# Patient Record
Sex: FEMALE | Race: WHITE
[De-identification: names, ages, dates, MRNs, and addresses within clinical notes are randomized per-mention and may not be internally consistent; named-entity substitution may affect disease eponyms.]

---

## 2020-01-16 NOTE — REP
Clinical:  Anatomical evaluation.

 

Comparison: None .

 

Findings:

Examination demonstrates a single live intrauterine pregnancy in breech

presentation.  Fetal motion is identified by technologist.  Placenta is noted the

posterior and grade zero without evidence for placenta previa or abruption.

Amniotic fluid volume is normal.  Cervix measures 3.7 cm in length and appears

closed.  No evidence for nuchal cord.

 

Multiple intrauterine fibroids are identified measuring up to 5.5 x 4.0 x 2.6

cm.

 

Gestational age by current measurements 20 weeks 5 days with JIGNESH 05/29/2020 .

 

FHR equals 136 beats per minute.

BPD  4.8 cm     20 weeks 4 days

HC  16.8 cm     19 weeks 3 days

AC  15.8 cm     21 weeks 0 days

FL  3.1 cm      19 weeks 4 days

HL  2.8 cm      19 weeks 0 days

HC/AC ratio  1.06

 

Estimated fetal weight 342 grams ( 33rd percentile).

 

Anatomical assessment demonstrates normal structures including cranium, choroid

plexus, cavum, cerebellum/posterior fossa, lungs, four-chamber heart,  diaphragm,

stomach, cord insertion/three-vessel cord, kidneys/bladder, and extremities.

 

Limited evaluation of the facial features, cardiac ventricular outflow tracts,

and spine.

 

Impression:

1.  Single live intrauterine pregnancy demonstrating appropriate estimated fetal

weight.

2.  Multiple uterine fibroids up to 5.5 cm.

3.  Anatomical limitations as noted above.

## 2020-02-20 NOTE — REP
Clinical:  Anatomical evaluation.

 

Comparison: 01/15/2020 .

 

Findings:

Examination demonstrates a single live intrauterine pregnancy in breech

presentation.  Fetal motion is identified by technologist.  Placenta is noted

posterior and grade I without evidence for placenta previa or abruption.

Amniotic fluid volume is normal.  Cervix measures 4.0 cm in length and appears

closed.  No evidence for nuchal cord.

 

Multiple fibroids are again noted and stable from prior examination measuring

approximately 3.0 cm, 3.3 cm, and 4.4 cm maximal diameter each.

 

Gestational age by LMP 25 weeks 6 days with JIGNESH 05/29/2020 .

Gestational age by current measurements 25 weeks 1 day with JIGNESH 06/03/2020 .

 

FHR equals 132 beats per minute.

Estimated fetal weight 847 grams ( 41st percentile).

 

Anatomical assessment demonstrates normal right cardiac ventricular outflow

tract.  Continued limited evaluation of the facial features and spine due to

fetal position and body habitus.

 

Impression:

1.  Continued limited evaluation of the facial features and spine.

2.  Stable uterine fibroids.

## 2020-04-06 NOTE — REP
REASON:  Followup fetal facial features and spine

 

The prior examination of 02/20/2020 showed normal fetal anatomy with the

exception of optimal visualization of the fetal facial features and spine for

which this examination was performed.

 

Multiple ultrasonographic image  of the gravid uterus show a single living

intrauterine gestation in the cephalic presentation.  Doppler interrogation of

the fetal heart shows the heart rate of 167 beats per minute.  The placenta is

posterior, fundal and not low-lying.  The subjective amniotic fluid volume is

within normal limits.  The cervix measures 3.7 cm in length and is closed.

Evaluation of the maternal adnexal spaces shows no abnormality.

 

Once again, there are multiple uterine fibroid status quo.

 

CHART:

 

BPD    8.7 cm  35 weeks 1 day

 

HC    32.7 cm = 37 weeks 1 day

 

AC    29.5 cm = 33 weeks 3 days

 

FL       6.3 cm = 32 weeks 4 days

 

The estimated fetal weight is  2265 grams, which is at the 97th percentile for a

31-week-2-day gestational age.

 

The fetal spine and fetal facial features were seen to be within normal limits.

 

 

IMPRESSION:

 

Single living intrauterine gestation as described above with an estimated

gestational age of 34 weeks 0 days via composite criteria.  I have been given no

JIGNESH based on today's composite criteria.  It was not calculated from the

worksheet parameters.  The only JIGNESH typed on the worksheet is 06/06/2020 but is

not stated that that JIGNESH is based on today's exam.  This needs further

evaluation.

 

Normal visualization of the fetal spine and the fetal facial features completes

the fetal anatomical screen.

## 2020-05-15 NOTE — REP
OBSTETRIC SONOGRAPHY:

 

HISTORY:  Supervision of pregnancy.  Fetal growth study.

 

FINDINGS:  Scanning through the gravid uterus demonstrates a viable single

intrauterine gestation in a cephalic fetal lie.  Fetal motion is observed and

heart rate is recorded at 160 beats per minute.  A posterior grade 1 placenta is

seen without evidence of previa or abruption.  Amniotic fluid is subjectively

normal.  Closed cervical length is measured transabdominally at 3.3 cm.  There

has been appropriate interval growth.

 

BIOMETRY CHART:

 

BPD  10.0 cm = 41 weeks 1 day

 

Head circumference  35.3 cm = 41 weeks 1 day

 

Abdominal circumference  34.2 cm = 38 weeks 1 day

 

Femur length  7.1 cm =  36 weeks 3 days

 

Humeral length  6.4 cm = 37 weeks 2 days

 

HC/AC ratio normal 1.03

 

Cephalic index normal 0.81

 

Estimated fetal weight 3490 grams, 7 pounds 11 ounces, 80th percentile for 36

weeks 6 days.

 

JOSE normal 16.0 cm.

 

IMPRESSION:

 

Viable single intrauterine gestation at 38 weeks 1 day by today's composite

sonographic criteria.  Expected gestational age estimate based on prior

sonography is 38 weeks 0 days.  JIGNESH by prior sonography May 29, 2020.  There is

evidence of appropriate interval growth.

## 2020-05-23 NOTE — HPE
DATE OF ADMISSION:  2020

 

34-year-old,  (G) 1, para (P) 0, female at 38 and 0/7 weeks gestation by

last menstrual period consistent with 9 week ultrasound, and expected date of

confinement (EDC) of 2020 who presents for labor induction.  The indication

for induction at less than 39 weeks is gestational hypertension.  She denies

contractions or vaginal bleeding.

 

PRENATAL COURSE:  The patient initiated prenatal care at 13 weeks gestation.

Prenatal course was significant for elevated blood pressure of 150/88 starting at

35 weeks gestation.  She also had an ultrasound for growth that showed growth at

the 80th percentile at 36 plus weeks gestation.

 

MEDICAL HISTORY:  None.

 

ALLERGIES:

-  SULFA

-  PENICILLIN

 

SURGICAL HISTORY:

1.  Right ureteral reimplantation.

2.  Tonsillectomy.

3.  Ina teeth removal.

 

SOCIAL HISTORY:  The patient is .  She lives in [place].  She denies

cigarettes, alcohol or drug use.

 

FAMILY HISTORY:  Noncontributory.

 

PHYSICAL EXAMINATION:  Blood pressure 150/88.  Pulse 84.  Afebrile.  No apparent

distress.

Head and Neck Exam:  Normal.

Lungs:  Clear.

Heart:  Regular rate and rhythm.

Abdomen:  Nontender.  Gravid.  Fetal heart tones Category 1.  Contractions

irregular, mild.

Sterile Vaginal Exam:  Closed, 50%, -2, posterior, soft, vertex.

Extremities:  Nontender.

 

LABS:  Blood type A positive.  GBS negative.

 

ASSESSMENT:  34-year-old, G1, at 38 and 0/7 weeks gestation who presents for

labor induction due to gestational hypertension.

 

PLAN:  The patient is admitted on 2020.  Risks of induction discussed.

## 2020-05-26 NOTE — IPNPDOC
Postpartum Progress Note


Date of Service:  May 26, 2020


Postpartum Day#:  1


Postpartum Progress Note


SUBJECT: Patient is a 34-year-old female who presented for IOL due to GHTN at 38

weeks. She received multiple doses of cytotec and IV Pitocin and made minimal 

cervical change. She had a  section that was uncomplicated. The baby is 

in the NICU due to inability to maintain blood sugars. She has voided and is 

ambulating without difficulty. She reports her pain in managed. 





OBJECTIVE: 


VITAL SIGNS: Within normal limits, afebrile.


Alert and oriented times three.


Breath sounds clear to auscultation.


Heart rate: Regular rate and rhythm, no murmurs, rubs or gallops.


Abdomen: Fundus firm at U. Dressing is intact with minimal drainage noted. 


Minimal lochia.





ASSESSMENT: Day 1 postoperative





PLAN:


1. Continue supportive nursing care and pain management. 


2. Anticipate discharge to home tomorrow.





VS, I&O, 24H, Fishbone


Vital Signs/I&O





Vital Signs








  Date Time  Temp Pulse Resp B/P (MAP) Pulse Ox O2 Delivery O2 Flow Rate FiO2


 


20 11:09   18     


 


20 09:55 97.8 91  120/60 (80) 98 Room Air  














I&O- Last 24 Hours up to 6 AM 


 


 20





 05:59


 


Intake Total 3915 ml


 


Output Total 2530 ml


 


Balance 1385 ml











Laboratory Data


24H LABS


Laboratory Tests 2


20 07:04: Nucleated Red Blood Cells % (auto) 0.0


CBC/BMP


Laboratory Tests


20 07:04

















GARCIA HANEY CNM            May 26, 2020 11:27

## 2020-05-28 NOTE — DSES
DATE OF ADMISSION:  2020

DATE OF DISCHARGE:  ____________________

 

DISCHARGE DIAGNOSIS:  Primary  section at term, postoperative day #3,

stable condition.

 

SURGEON:  Dr. Tacos Shi.

 

ASSISTANT:  Michelle Rowe CNM.

 

HISTORY:  Gabbie is a 34-year-old,  1, para 1-0-0-1 now, was admitted to

labor and delivery due to gestational hypertension.  She did undergo a primary

 section due to a nonreassuring fetal heart rate and a failed induction.

She did deliver an 8 pound 5 ounce male.  There was an estimated blood loss of

600 mL.  Her postoperative course has been uncomplicated.  Her pain has been well

managed with by mouth pain medications.  She is tolerating by mouth fluids and a

regular diet.  She is managing ambulation, self-care, per care.   is in

the  intensive care unit (NICU), and she has managed to visit the 

without use of a wheelchair.

 

OBJECTIVE:

Temperature 97.9, pulse 97, respirations 20, blood pressure (BP) 131/68.

Breasts:  Are soft, nontender.

Her abdomen:  Fundus firm at umbilicus.  Incision is a dressing dry and intact.

There is no drainage.

Perineum is intact.  Lochia, rubra scant.

Bilateral lower extremities with +1 pitting edema.

 

Preoperative complete blood count (CBC) on 2020 with a hemoglobin of 10.8,

hematocrit 33.5, platelets 340.  Postoperative CBC on 2020:  hemoglobin of

9.2, hematocrit 28.4, and platelets are 272.

 

PLAN:  Discharged the patient home today.  She is hopeful for border status as

the  is still in the  intensive care unit.  I did review discharge

instructions that include breast care, incision care, hermelindo care, pelvic rest,

activity and lifting restrictions, danger signs to report, access to care.  She

is to followup at [facility] for a 2-week incision check

and an 8-week postpartum visit.  Pain medications have been e-prescribed by Dr. Tacos Shi to her pharmacy.  The patient has had all of her questions answered

and is agreeable to discharge today.

## 2020-05-29 NOTE — RO
DATE OF PROCEDURE:  2020

 

PREPROCEDURE DIAGNOSIS:  38 weeks, gestational hypertension, arrest of dilation.

 

 

POSTPROCEDURE DIAGNOSIS:  38 weeks, gestational hypertension, arrest of dilation.

 

 

PROCEDURE:  Primary low transverse  section.

 

SURGEON:  Dr. Tacos Shi

 

ASSISTANT:  Michelle Rowe CNM

 

ANESTHESIA:  Epidural.

 

ESTIMATED BLOOD LOSS:  600 mL.

 

URINE OUTPUT:  100 mL.

 

FINDINGS:  8 pound and 5 ounce male infant, Apgars 8 and 9.  Normal uterus,

fallopian tubes and ovaries.

 

DESCRIPTION OF PROCEDURE:  The patient was taken to the operating room where

epidural anesthesia was adequate.  She was prepped and draped in sterile fashion

in the supine position.  A Hamilton catheter was already in place.  A Traxi pannus

retractor was placed.  A Pfannenstiel skin incision was made with the scalpel.

This was carried down to the fascia.  The fascia was nicked and extended.  The

fascia was dissected off the rectus muscles.  The peritoneal cavity was entered.

A bladder flap was created.  A Mobius retractor was placed.  A curvilinear

incision was made in the lower uterine segment until clear fluid was noted.  This

was extended manually.  The infant was delivered from the vertex position with

single use of the vacuum extractor.  The shoulders delivered with ease.  The cord

was doubly clamped and cut.  The infant was handed off to the awaiting nurses.

The placenta was expressed.  The uterus was closed with #0 Vicryl in a running

locked fashion.  A second imbricating layer of #0 Vicryl was placed.  The Mobius

retractor was removed.  The peritoneum was closed with #2-0 Vicryl.  The fascia

was closed with #0 Vicryl in a running fashion.  The deep layer was irrigated and

closed with #2-0 chromic.  The skin was closed with #4-0 Monocryl subcuticular

sutures.  Sponge, instrument and needle counts were correct.

 

Michelle Rowe CNM, assisted throughout the procedure.  She helped create each

layer of the incision, she helped deliver the fetus and subsequently closed.  She

was indispensable to the successful performance of the procedure.

## 2020-06-15 ENCOUNTER — HOSPITAL ENCOUNTER (EMERGENCY)
Dept: HOSPITAL 53 - M ED | Age: 35
Discharge: HOME | End: 2020-06-15
Payer: COMMERCIAL

## 2020-06-15 VITALS
BODY MASS INDEX: 40.51 KG/M2 | DIASTOLIC BLOOD PRESSURE: 78 MMHG | WEIGHT: 228.62 LBS | SYSTOLIC BLOOD PRESSURE: 138 MMHG | HEIGHT: 63 IN

## 2020-06-15 DIAGNOSIS — Z88.2: ICD-10-CM

## 2020-06-15 DIAGNOSIS — Z79.899: ICD-10-CM

## 2020-06-15 DIAGNOSIS — Z88.0: ICD-10-CM

## 2020-06-15 DIAGNOSIS — O91.13: ICD-10-CM

## 2020-06-15 DIAGNOSIS — Z87.59: ICD-10-CM

## 2020-06-15 LAB
BASOPHILS # BLD AUTO: 0 10^3/UL (ref 0–0.2)
BASOPHILS NFR BLD AUTO: 0.2 % (ref 0–1)
BUN SERPL-MCNC: 19 MG/DL (ref 7–18)
CALCIUM SERPL-MCNC: 9.5 MG/DL (ref 8.5–10.1)
CHLORIDE SERPL-SCNC: 105 MEQ/L (ref 98–107)
CO2 SERPL-SCNC: 24 MEQ/L (ref 21–32)
CREAT SERPL-MCNC: 0.99 MG/DL (ref 0.55–1.3)
EOSINOPHIL # BLD AUTO: 0 10^3/UL (ref 0–0.5)
EOSINOPHIL NFR BLD AUTO: 0 % (ref 0–3)
GFR SERPL CREATININE-BSD FRML MDRD: > 60 ML/MIN/{1.73_M2} (ref 60–?)
GLUCOSE SERPL-MCNC: 91 MG/DL (ref 70–100)
HCT VFR BLD AUTO: 41.1 % (ref 36–47)
HGB BLD-MCNC: 13.1 G/DL (ref 12–15.5)
LYMPHOCYTES # BLD AUTO: 1.2 10^3/UL (ref 1.5–5)
LYMPHOCYTES NFR BLD AUTO: 7 % (ref 24–44)
MCH RBC QN AUTO: 28.9 PG (ref 27–33)
MCHC RBC AUTO-ENTMCNC: 31.9 G/DL (ref 32–36.5)
MCV RBC AUTO: 90.7 FL (ref 80–96)
MONOCYTES # BLD AUTO: 0.8 10^3/UL (ref 0–0.8)
MONOCYTES NFR BLD AUTO: 5 % (ref 0–5)
NEUTROPHILS # BLD AUTO: 14.7 10^3/UL (ref 1.5–8.5)
NEUTROPHILS NFR BLD AUTO: 87.4 % (ref 36–66)
PLATELET # BLD AUTO: 416 10^3/UL (ref 150–450)
POTASSIUM SERPL-SCNC: 5.2 MEQ/L (ref 3.5–5.1)
RBC # BLD AUTO: 4.53 10^6/UL (ref 4–5.4)
SODIUM SERPL-SCNC: 137 MEQ/L (ref 136–145)
WBC # BLD AUTO: 16.9 10^3/UL (ref 4–10)

## 2021-03-09 ENCOUNTER — HOSPITAL ENCOUNTER (OUTPATIENT)
Dept: HOSPITAL 53 - M PLALAB | Age: 36
End: 2021-03-09
Attending: PHYSICIAN ASSISTANT
Payer: COMMERCIAL

## 2021-03-09 DIAGNOSIS — M65.4: Primary | ICD-10-CM

## 2021-03-09 LAB
APPEARANCE UR: (no result)
BACTERIA UR QL AUTO: (no result)
BASOPHILS # BLD AUTO: 0.1 10^3/UL (ref 0–0.2)
BASOPHILS NFR BLD AUTO: 0.8 % (ref 0–1)
BILIRUB UR QL STRIP.AUTO: NEGATIVE
CRP SERPL-MCNC: 0.59 MG/DL (ref 0–0.3)
EOSINOPHIL # BLD AUTO: 0.1 10^3/UL (ref 0–0.5)
EOSINOPHIL NFR BLD AUTO: 1.4 % (ref 0–3)
ERYTHROCYTE [SEDIMENTATION RATE] IN BLOOD BY WESTERGREN METHOD: 22 MM/HR (ref 0–20)
GLUCOSE UR QL STRIP.AUTO: NEGATIVE MG/DL
HCT VFR BLD AUTO: 41.1 % (ref 36–47)
HGB BLD-MCNC: 13.3 G/DL (ref 12–15.5)
HGB UR QL STRIP.AUTO: NEGATIVE
KETONES UR QL STRIP.AUTO: (no result) MG/DL
LEUKOCYTE ESTERASE UR QL STRIP.AUTO: (no result)
LYMPHOCYTES # BLD AUTO: 4.6 10^3/UL (ref 1.5–5)
LYMPHOCYTES NFR BLD AUTO: 44.8 % (ref 24–44)
MCH RBC QN AUTO: 30.1 PG (ref 27–33)
MCHC RBC AUTO-ENTMCNC: 32.4 G/DL (ref 32–36.5)
MCV RBC AUTO: 93 FL (ref 80–96)
MONOCYTES # BLD AUTO: 0.5 10^3/UL (ref 0–0.8)
MONOCYTES NFR BLD AUTO: 5.2 % (ref 2–8)
MUCOUS THREADS URNS QL MICRO: (no result)
NEUTROPHILS # BLD AUTO: 4.9 10^3/UL (ref 1.5–8.5)
NEUTROPHILS NFR BLD AUTO: 47.5 % (ref 36–66)
NITRITE UR QL STRIP.AUTO: NEGATIVE
PH UR STRIP.AUTO: 5 UNITS (ref 5–9)
PLATELET # BLD AUTO: 386 10^3/UL (ref 150–450)
PROT UR QL STRIP.AUTO: (no result) MG/DL
RBC # BLD AUTO: 4.42 10^6/UL (ref 4–5.4)
RBC # UR AUTO: 2 /HPF (ref 0–3)
RHEUMATOID FACT SERPL-ACNC: < 10 IU/ML (ref ?–15)
SP GR UR STRIP.AUTO: 1.02 (ref 1–1.03)
SQUAMOUS #/AREA URNS AUTO: 21 /HPF (ref 0–6)
UROBILINOGEN UR QL STRIP.AUTO: 0.2 MG/DL (ref 0–2)
WBC # BLD AUTO: 10.3 10^3/UL (ref 4–10)
WBC #/AREA URNS AUTO: 4 /HPF (ref 0–3)

## 2021-03-11 LAB
B BURGDOR IGG+IGM SER-ACNC: <0.91 ISR (ref 0–0.9)
B BURGDOR IGM SER IA-ACNC: <0.8 INDEX (ref 0–0.79)

## 2021-03-23 ENCOUNTER — HOSPITAL ENCOUNTER (OUTPATIENT)
Dept: HOSPITAL 53 - M RAD | Age: 36
End: 2021-03-23
Attending: PHYSICIAN ASSISTANT
Payer: COMMERCIAL

## 2021-03-23 DIAGNOSIS — M65.4: Primary | ICD-10-CM

## 2021-03-23 NOTE — REP
INDICATION:

RIGHT RADIAL STYLOID TENOSYNOVITIS DE QUERVAIN.



COMPARISON:

None.



TECHNIQUE:

Multiple sequences obtained in the axial, coronal and sagittal planes.



FINDINGS:

Triangular fibrocartilage complex:No evidence of tear.

Scapholunate and lunatotriquetral ligaments: Intact.

Flexor and extensor tendons: There is mild fluid surrounding the extensor pollicis

brevis and abductor pollicis longus tendons at the level of the distal radius and

extending distally, compatible with mild tenosynovitis..

Carpal tunnel region:The median nerve demonstrates mild increased signal on T2

weighted images.  There is mild anterior bowing of the flexor retinaculum.  These

findings may indicate some degree of carpal tunnel syndrome.

No ganglion cyst is seen.

Joint fluid: No effusion.

Distal radioulnar joint: No significant fluid present.

Bone marrow: No edema or occult fracture.



IMPRESSION:

There are findings of mild tenosynovitis involving the extensor pollicis brevis and

abductor pollicis longus tendons.



There are findings suggesting carpal tunnel syndrome.  Clinical correlation necessary.





<Electronically signed by Ron Clayton > 03/23/21 2010

## 2021-04-13 ENCOUNTER — HOSPITAL ENCOUNTER (OUTPATIENT)
Dept: HOSPITAL 53 - M PLALAB | Age: 36
End: 2021-04-13
Attending: PHYSICIAN ASSISTANT
Payer: COMMERCIAL

## 2021-04-13 DIAGNOSIS — M65.4: Primary | ICD-10-CM

## 2021-10-10 ENCOUNTER — HOSPITAL ENCOUNTER (EMERGENCY)
Dept: HOSPITAL 53 - M ED | Age: 36
LOS: 1 days | Discharge: HOME | End: 2021-10-11
Payer: COMMERCIAL

## 2021-10-10 VITALS — HEIGHT: 63 IN | BODY MASS INDEX: 47.62 KG/M2 | WEIGHT: 268.74 LBS

## 2021-10-10 DIAGNOSIS — K59.00: Primary | ICD-10-CM

## 2021-10-10 DIAGNOSIS — Z88.2: ICD-10-CM

## 2021-10-10 DIAGNOSIS — K76.0: ICD-10-CM

## 2021-10-10 DIAGNOSIS — Z88.0: ICD-10-CM

## 2021-10-10 DIAGNOSIS — Z97.5: ICD-10-CM

## 2021-10-10 PROCEDURE — 80048 BASIC METABOLIC PNL TOTAL CA: CPT

## 2021-10-10 PROCEDURE — 81001 URINALYSIS AUTO W/SCOPE: CPT

## 2021-10-10 PROCEDURE — 85025 COMPLETE CBC W/AUTO DIFF WBC: CPT

## 2021-10-10 PROCEDURE — 80076 HEPATIC FUNCTION PANEL: CPT

## 2021-10-10 PROCEDURE — 96374 THER/PROPH/DIAG INJ IV PUSH: CPT

## 2021-10-10 PROCEDURE — 99284 EMERGENCY DEPT VISIT MOD MDM: CPT

## 2021-10-10 PROCEDURE — 93041 RHYTHM ECG TRACING: CPT

## 2021-10-10 PROCEDURE — 84703 CHORIONIC GONADOTROPIN ASSAY: CPT

## 2021-10-10 PROCEDURE — 83605 ASSAY OF LACTIC ACID: CPT

## 2021-10-10 PROCEDURE — 74177 CT ABD & PELVIS W/CONTRAST: CPT

## 2021-10-10 PROCEDURE — 83690 ASSAY OF LIPASE: CPT

## 2021-10-11 VITALS — SYSTOLIC BLOOD PRESSURE: 134 MMHG | DIASTOLIC BLOOD PRESSURE: 63 MMHG

## 2021-10-11 LAB
ALBUMIN SERPL BCG-MCNC: 4.1 GM/DL (ref 3.2–5.2)
ALT SERPL W P-5'-P-CCNC: 13 U/L (ref 12–78)
B-HCG SERPL QL: NEGATIVE
BASOPHILS # BLD AUTO: 0.1 10^3/UL (ref 0–0.2)
BASOPHILS NFR BLD AUTO: 0.6 % (ref 0–1)
BILIRUB CONJ SERPL-MCNC: < 0.1 MG/DL (ref 0–0.2)
BILIRUB SERPL-MCNC: 0.3 MG/DL (ref 0.2–1)
BUN SERPL-MCNC: 15 MG/DL (ref 7–18)
CALCIUM SERPL-MCNC: 9.3 MG/DL (ref 8.5–10.1)
CHLORIDE SERPL-SCNC: 106 MEQ/L (ref 98–107)
CO2 SERPL-SCNC: 27 MEQ/L (ref 21–32)
CREAT SERPL-MCNC: 0.95 MG/DL (ref 0.55–1.3)
EOSINOPHIL # BLD AUTO: 0.2 10^3/UL (ref 0–0.5)
EOSINOPHIL NFR BLD AUTO: 1.4 % (ref 0–3)
GFR SERPL CREATININE-BSD FRML MDRD: > 60 ML/MIN/{1.73_M2} (ref 60–?)
GLUCOSE SERPL-MCNC: 100 MG/DL (ref 70–100)
HCT VFR BLD AUTO: 42.3 % (ref 36–47)
HGB BLD-MCNC: 13.8 G/DL (ref 12–15.5)
LIPASE SERPL-CCNC: 105 U/L (ref 73–393)
LYMPHOCYTES # BLD AUTO: 5 10^3/UL (ref 1.5–5)
LYMPHOCYTES NFR BLD AUTO: 45.8 % (ref 24–44)
MCH RBC QN AUTO: 30.6 PG (ref 27–33)
MCHC RBC AUTO-ENTMCNC: 32.6 G/DL (ref 32–36.5)
MCV RBC AUTO: 93.8 FL (ref 80–96)
MONOCYTES # BLD AUTO: 0.5 10^3/UL (ref 0–0.8)
MONOCYTES NFR BLD AUTO: 4.3 % (ref 2–8)
NEUTROPHILS # BLD AUTO: 5.2 10^3/UL (ref 1.5–8.5)
NEUTROPHILS NFR BLD AUTO: 47.6 % (ref 36–66)
PLATELET # BLD AUTO: 420 10^3/UL (ref 150–450)
POTASSIUM SERPL-SCNC: 4.2 MEQ/L (ref 3.5–5.1)
PROT SERPL-MCNC: 7.9 GM/DL (ref 6.4–8.2)
RBC # BLD AUTO: 4.51 10^6/UL (ref 4–5.4)
SODIUM SERPL-SCNC: 139 MEQ/L (ref 136–145)
WBC # BLD AUTO: 11 10^3/UL (ref 4–10)

## 2021-10-11 NOTE — REPVR
PROCEDURE INFORMATION: 

Exam: CT Abdomen And Pelvis With Contrast 

Exam date and time: 10/11/2021 3:43 AM 

Age: 36 years old 

Clinical indication: Abdominal pain; Additional info: Luq pain, lower left 

chest wall pain 



TECHNIQUE: 

Imaging protocol: Computed tomography of the abdomen and pelvis with contrast. 

Radiation optimization: All CT scans at this facility use at least one of these 

dose optimization techniques: automated exposure control; mA and/or kV 

adjustment per patient size (includes targeted exams where dose is matched to 

clinical indication); or iterative reconstruction. 

Contrast material: ; Contrast volume: 100 ml; Contrast route: 

INTRAVENOUS (IV);  



COMPARISON: 

None



FINDINGS: 

Pleural spaces: No acute airspace or pleural disease. 

Liver: Fatty infiltration of the liver. 

Gallbladder and bile ducts: Unremarkable gallbladder. No biliary ductal 

dilatation. 

Pancreas: No pancreatic mass or ductal dilatation. 

Spleen: No splenomegaly. 

Adrenal glands: Unremarkable adrenals. 

Kidneys and ureters: Normal renal morphology. No hydronephrosis. 

Stomach and bowel: Questionable wall thickening in the nondistended stomach. 

Prominent stool, in a pattern of constipation. Diverticula, without pericolonic 

inflammation. 

Appendix: No acute appendicitis. 

Intraperitoneal space: No significant free fluid. 

Vasculature:  Normal caliber of the abdominal aorta.  

Lymph nodes: Multiple lymph nodes, the majority of which are subcentimeter in 

size. 

Urinary bladder: Minimal left-sided bladder wall thickening. 

Reproductive: IUD in the endometrial cavity of the uterus. 

Bones/joints: Marginal osteophytes and Schmorl's nodes. 

Soft tissues: Fat containing umbilical hernia. 



IMPRESSION: 

1. No acute inflammatory process in the visualized abdomen and pelvis.

2. Prominent stool, in a pattern suggesting constipation.

3. Additional findings as described above.   



Electronically signed by: Moe Pineda On 10/11/2021  07:04:53 AM

## 2022-08-01 ENCOUNTER — HOSPITAL ENCOUNTER (OUTPATIENT)
Dept: HOSPITAL 53 - M PLALAB | Age: 37
End: 2022-08-01
Payer: COMMERCIAL

## 2022-08-01 DIAGNOSIS — M79.10: Primary | ICD-10-CM

## 2022-08-01 LAB
ALBUMIN SERPL BCG-MCNC: 3.8 GM/DL (ref 3.2–5.2)
ALT SERPL W P-5'-P-CCNC: 21 U/L (ref 12–78)
BASOPHILS # BLD AUTO: 0 10^3/UL (ref 0–0.2)
BASOPHILS NFR BLD AUTO: 0.5 % (ref 0–1)
BILIRUB SERPL-MCNC: 0.2 MG/DL (ref 0.2–1)
BUN SERPL-MCNC: 13 MG/DL (ref 7–18)
CALCIUM SERPL-MCNC: 9.6 MG/DL (ref 8.5–10.1)
CHLORIDE SERPL-SCNC: 106 MEQ/L (ref 98–107)
CO2 SERPL-SCNC: 27 MEQ/L (ref 21–32)
CREAT SERPL-MCNC: 0.69 MG/DL (ref 0.55–1.3)
CRP SERPL-MCNC: 0.98 MG/DL (ref 0–0.3)
EOSINOPHIL # BLD AUTO: 0.1 10^3/UL (ref 0–0.5)
EOSINOPHIL NFR BLD AUTO: 1.6 % (ref 0–3)
ERYTHROCYTE [SEDIMENTATION RATE] IN BLOOD BY WESTERGREN METHOD: 23 MM/HR (ref 0–20)
GFR SERPL CREATININE-BSD FRML MDRD: > 60 ML/MIN/{1.73_M2} (ref 60–?)
GLUCOSE SERPL-MCNC: 96 MG/DL (ref 70–100)
HCT VFR BLD AUTO: 41.4 % (ref 36–47)
HGB BLD-MCNC: 13.3 G/DL (ref 12–15.5)
LYMPHOCYTES # BLD AUTO: 3.3 10^3/UL (ref 1.5–5)
LYMPHOCYTES NFR BLD AUTO: 42.8 % (ref 24–44)
MCH RBC QN AUTO: 30.8 PG (ref 27–33)
MCHC RBC AUTO-ENTMCNC: 32.1 G/DL (ref 32–36.5)
MCV RBC AUTO: 95.8 FL (ref 80–96)
MONOCYTES # BLD AUTO: 0.4 10^3/UL (ref 0–0.8)
MONOCYTES NFR BLD AUTO: 5.3 % (ref 2–8)
NEUTROPHILS # BLD AUTO: 3.8 10^3/UL (ref 1.5–8.5)
NEUTROPHILS NFR BLD AUTO: 49.5 % (ref 36–66)
PLATELET # BLD AUTO: 397 10^3/UL (ref 150–450)
POTASSIUM SERPL-SCNC: 4.4 MEQ/L (ref 3.5–5.1)
PROT SERPL-MCNC: 7.3 GM/DL (ref 6.4–8.2)
RBC # BLD AUTO: 4.32 10^6/UL (ref 4–5.4)
RHEUMATOID FACT SERPL-ACNC: < 10 IU/ML (ref ?–15)
SODIUM SERPL-SCNC: 138 MEQ/L (ref 136–145)
WBC # BLD AUTO: 7.7 10^3/UL (ref 4–10)

## 2022-08-05 ENCOUNTER — HOSPITAL ENCOUNTER (OUTPATIENT)
Dept: HOSPITAL 53 - M PLALAB | Age: 37
End: 2022-08-05
Attending: NURSE PRACTITIONER
Payer: COMMERCIAL

## 2022-08-05 DIAGNOSIS — Z12.4: Primary | ICD-10-CM

## 2022-08-05 PROCEDURE — 87624 HPV HI-RISK TYP POOLED RSLT: CPT

## 2023-07-07 ENCOUNTER — HOSPITAL ENCOUNTER (OUTPATIENT)
Dept: HOSPITAL 53 - M PLALAB | Age: 38
End: 2023-07-07
Payer: COMMERCIAL

## 2023-07-07 DIAGNOSIS — Z00.00: Primary | ICD-10-CM

## 2023-07-07 LAB
ALBUMIN SERPL BCG-MCNC: 3.6 G/DL (ref 3.2–5.2)
ALP SERPL-CCNC: 80 U/L (ref 46–116)
ALT SERPL W P-5'-P-CCNC: 10 U/L (ref 7–40)
AST SERPL-CCNC: < 8 U/L (ref ?–34)
BASOPHILS # BLD AUTO: 0.1 10^3/UL (ref 0–0.2)
BASOPHILS NFR BLD AUTO: 0.6 % (ref 0–1)
BILIRUB SERPL-MCNC: 0.3 MG/DL (ref 0.3–1.2)
BUN SERPL-MCNC: 14 MG/DL (ref 9–23)
CALCIUM SERPL-MCNC: 9.9 MG/DL (ref 8.5–10.1)
CHLORIDE SERPL-SCNC: 103 MMOL/L (ref 98–107)
CHOLEST SERPL-MCNC: 236 MG/DL (ref ?–200)
CHOLEST/HDLC SERPL: 3.79 {RATIO} (ref ?–5)
CO2 SERPL-SCNC: 28 MMOL/L (ref 20–31)
CREAT SERPL-MCNC: 0.59 MG/DL (ref 0.55–1.3)
EOSINOPHIL # BLD AUTO: 0 10^3/UL (ref 0–0.5)
EOSINOPHIL NFR BLD AUTO: 0.1 % (ref 0–3)
GFR SERPL CREATININE-BSD FRML MDRD: > 60 ML/MIN/{1.73_M2} (ref 58–?)
GLUCOSE SERPL-MCNC: 93 MG/DL (ref 60–100)
HCT VFR BLD AUTO: 38.7 % (ref 36–47)
HDLC SERPL-MCNC: 62.2 MG/DL (ref 40–?)
HGB BLD-MCNC: 12.4 G/DL (ref 12–15.5)
LDLC SERPL CALC-MCNC: 151 MG/DL (ref ?–100)
LYMPHOCYTES # BLD AUTO: 3.2 10^3/UL (ref 1.5–5)
LYMPHOCYTES NFR BLD AUTO: 39.7 % (ref 24–44)
MCH RBC QN AUTO: 30.5 PG (ref 27–33)
MCHC RBC AUTO-ENTMCNC: 32 G/DL (ref 32–36.5)
MCV RBC AUTO: 95.3 FL (ref 80–96)
MONOCYTES # BLD AUTO: 0.5 10^3/UL (ref 0–0.8)
MONOCYTES NFR BLD AUTO: 6.6 % (ref 2–8)
NEUTROPHILS # BLD AUTO: 4.3 10^3/UL (ref 1.5–8.5)
NEUTROPHILS NFR BLD AUTO: 52.8 % (ref 36–66)
NONHDLC SERPL-MCNC: 173.8 MG/DL
PLATELET # BLD AUTO: 375 10^3/UL (ref 150–450)
POTASSIUM SERPL-SCNC: 4.3 MMOL/L (ref 3.5–5.1)
PROT SERPL-MCNC: 6.7 G/DL (ref 5.7–8.2)
RBC # BLD AUTO: 4.06 10^6/UL (ref 4–5.4)
SODIUM SERPL-SCNC: 138 MMOL/L (ref 136–145)
TRIGL SERPL-MCNC: 114 MG/DL (ref ?–150)
WBC # BLD AUTO: 8.1 10^3/UL (ref 4–10)

## 2023-08-07 ENCOUNTER — HOSPITAL ENCOUNTER (OUTPATIENT)
Dept: HOSPITAL 53 - M WHC | Age: 38
End: 2023-08-07
Attending: NURSE PRACTITIONER
Payer: COMMERCIAL

## 2023-08-07 ENCOUNTER — HOSPITAL ENCOUNTER (OUTPATIENT)
Dept: HOSPITAL 53 - M SFHCWAGY | Age: 38
End: 2023-08-07
Attending: NURSE PRACTITIONER
Payer: COMMERCIAL

## 2023-08-07 DIAGNOSIS — Z12.4: Primary | ICD-10-CM

## 2023-08-07 DIAGNOSIS — Z12.31: Primary | ICD-10-CM

## 2023-08-07 PROCEDURE — 87624 HPV HI-RISK TYP POOLED RSLT: CPT

## 2024-08-22 ENCOUNTER — HOSPITAL ENCOUNTER (OUTPATIENT)
Dept: HOSPITAL 53 - M WHC | Age: 39
End: 2024-08-22
Attending: NURSE PRACTITIONER
Payer: COMMERCIAL

## 2024-08-22 ENCOUNTER — HOSPITAL ENCOUNTER (OUTPATIENT)
Dept: HOSPITAL 53 - M SFHCWAGY | Age: 39
End: 2024-08-22
Attending: NURSE PRACTITIONER
Payer: COMMERCIAL

## 2024-08-22 DIAGNOSIS — Z12.4: Primary | ICD-10-CM

## 2024-08-22 DIAGNOSIS — Z12.31: Primary | ICD-10-CM

## 2024-08-22 DIAGNOSIS — Z80.3: ICD-10-CM

## 2024-08-22 DIAGNOSIS — R92.333: ICD-10-CM

## 2024-08-22 PROCEDURE — 87624 HPV HI-RISK TYP POOLED RSLT: CPT

## 2025-02-17 ENCOUNTER — HOSPITAL ENCOUNTER (OUTPATIENT)
Dept: HOSPITAL 53 - M PLALAB | Age: 40
End: 2025-02-17
Attending: EMERGENCY MEDICINE
Payer: COMMERCIAL

## 2025-02-17 DIAGNOSIS — E66.3: Primary | ICD-10-CM

## 2025-02-17 LAB
ALBUMIN SERPL BCG-MCNC: 3.9 G/DL (ref 3.2–5.2)
ALP SERPL-CCNC: 79 U/L (ref 35–104)
ALT SERPL W P-5'-P-CCNC: 15 U/L (ref 7–40)
AST SERPL-CCNC: 14 U/L (ref ?–34)
BASOPHILS # BLD AUTO: 0 10^3/UL (ref 0–0.2)
BASOPHILS NFR BLD AUTO: 0.6 % (ref 0–1)
BILIRUB SERPL-MCNC: 0.3 MG/DL (ref 0.3–1.2)
BUN SERPL-MCNC: 16 MG/DL (ref 9–23)
CALCIUM SERPL-MCNC: 9.3 MG/DL (ref 8.5–10.1)
CHLORIDE SERPL-SCNC: 106 MMOL/L (ref 98–107)
CHOLEST SERPL-MCNC: 230 MG/DL (ref ?–200)
CHOLEST/HDLC SERPL: 3.95 {RATIO} (ref ?–5)
CO2 SERPL-SCNC: 25 MMOL/L (ref 20–31)
CREAT SERPL-MCNC: 0.69 MG/DL (ref 0.55–1.3)
EOSINOPHIL # BLD AUTO: 0.1 10^3/UL (ref 0–0.5)
EOSINOPHIL NFR BLD AUTO: 1.3 % (ref 0–3)
EST. AVERAGE GLUCOSE BLD GHB EST-MCNC: 97 MG/DL (ref 60–110)
GFR SERPL CREATININE-BSD FRML MDRD: > 60 ML/MIN/{1.73_M2} (ref 58–?)
GLUCOSE SERPL-MCNC: 86 MG/DL (ref 60–100)
HCT VFR BLD AUTO: 41.2 % (ref 36–47)
HDLC SERPL-MCNC: 58.2 MG/DL (ref 40–?)
HGB BLD-MCNC: 13.5 G/DL (ref 12–15.5)
LDLC SERPL CALC-MCNC: 148.8 MG/DL (ref ?–100)
LYMPHOCYTES # BLD AUTO: 2.9 10^3/UL (ref 1.5–5)
LYMPHOCYTES NFR BLD AUTO: 40.4 % (ref 24–44)
MCH RBC QN AUTO: 31.4 PG (ref 27–33)
MCHC RBC AUTO-ENTMCNC: 32.8 G/DL (ref 32–36.5)
MCV RBC AUTO: 95.8 FL (ref 80–96)
MONOCYTES # BLD AUTO: 0.4 10^3/UL (ref 0–0.8)
MONOCYTES NFR BLD AUTO: 5.7 % (ref 2–8)
NEUTROPHILS # BLD AUTO: 3.7 10^3/UL (ref 1.5–8.5)
NEUTROPHILS NFR BLD AUTO: 51.7 % (ref 36–66)
NONHDLC SERPL-MCNC: 171.8 MG/DL
PLATELET # BLD AUTO: 412 10^3/UL (ref 150–450)
POTASSIUM SERPL-SCNC: 4.4 MMOL/L (ref 3.5–5.1)
PROT SERPL-MCNC: 7.5 G/DL (ref 5.7–8.2)
RBC # BLD AUTO: 4.3 10^6/UL (ref 4–5.4)
SODIUM SERPL-SCNC: 140 MMOL/L (ref 136–145)
TRIGL SERPL-MCNC: 115 MG/DL (ref ?–150)
TSH SERPL DL<=0.005 MIU/L-ACNC: 1.24 UIU/ML (ref 0.55–4.78)
WBC # BLD AUTO: 7.2 10^3/UL (ref 4–10)

## 2025-07-14 ENCOUNTER — HOSPITAL ENCOUNTER (OUTPATIENT)
Dept: HOSPITAL 53 - M PLALAB | Age: 40
End: 2025-07-14
Payer: COMMERCIAL

## 2025-07-14 DIAGNOSIS — Z00.00: Primary | ICD-10-CM

## 2025-07-14 LAB
ALBUMIN SERPL BCG-MCNC: 3.9 G/DL (ref 3.2–5.2)
ALP SERPL-CCNC: 85 U/L (ref 35–104)
ALT SERPL W P-5'-P-CCNC: 11 U/L (ref 7–40)
AST SERPL-CCNC: 22 U/L (ref ?–34)
BASOPHILS # BLD AUTO: 0.1 10^3/UL (ref 0–0.2)
BASOPHILS NFR BLD AUTO: 0.9 % (ref 0–1)
BILIRUB SERPL-MCNC: 0.5 MG/DL (ref 0.3–1.2)
BUN SERPL-MCNC: 10 MG/DL (ref 9–23)
CALCIUM SERPL-MCNC: 9.4 MG/DL (ref 8.5–10.1)
CHLORIDE SERPL-SCNC: 103 MMOL/L (ref 98–107)
CHOLEST SERPL-MCNC: 222 MG/DL (ref ?–200)
CHOLEST/HDLC SERPL: 3.97 {RATIO} (ref ?–5)
CO2 SERPL-SCNC: 27 MMOL/L (ref 20–31)
CREAT SERPL-MCNC: 0.73 MG/DL (ref 0.55–1.3)
EOSINOPHIL # BLD AUTO: 0.1 10^3/UL (ref 0–0.5)
EOSINOPHIL NFR BLD AUTO: 1.8 % (ref 0–3)
EST. AVERAGE GLUCOSE BLD GHB EST-MCNC: 97 MG/DL (ref 60–110)
GFR SERPL CREATININE-BSD FRML MDRD: > 90 ML/MIN/{1.73_M2} (ref 58–?)
GLUCOSE SERPL-MCNC: 83 MG/DL (ref 60–100)
HBA1C MFR BLD: 5 % (ref 4–6)
HCT VFR BLD AUTO: 41 % (ref 36–47)
HDLC SERPL-MCNC: 55.9 MG/DL (ref 40–?)
HGB BLD-MCNC: 13.2 G/DL (ref 12–15.5)
LDLC SERPL CALC-MCNC: 147.5 MG/DL (ref ?–100)
LYMPHOCYTES # BLD AUTO: 2.5 10^3/UL (ref 1.5–5)
LYMPHOCYTES NFR BLD AUTO: 36.5 % (ref 24–44)
MCH RBC QN AUTO: 30.6 PG (ref 27–33)
MCHC RBC AUTO-ENTMCNC: 32.2 G/DL (ref 32–36.5)
MCV RBC AUTO: 94.9 FL (ref 80–96)
MONOCYTES # BLD AUTO: 0.4 10^3/UL (ref 0–0.8)
MONOCYTES NFR BLD AUTO: 5.8 % (ref 2–8)
NEUTROPHILS # BLD AUTO: 3.8 10^3/UL (ref 1.5–8.5)
NEUTROPHILS NFR BLD AUTO: 54.9 % (ref 36–66)
NONHDLC SERPL-MCNC: 166.1 MG/DL
PLATELET # BLD AUTO: 402 10^3/UL (ref 150–450)
POTASSIUM SERPL-SCNC: 4.3 MMOL/L (ref 3.5–5.1)
PROT SERPL-MCNC: 7.1 G/DL (ref 5.7–8.2)
RBC # BLD AUTO: 4.32 10^6/UL (ref 4–5.4)
SODIUM SERPL-SCNC: 140 MMOL/L (ref 136–145)
TRIGL SERPL-MCNC: 93 MG/DL (ref ?–150)
WBC # BLD AUTO: 6.9 10^3/UL (ref 4–10)